# Patient Record
Sex: MALE | Race: WHITE | NOT HISPANIC OR LATINO | Employment: UNEMPLOYED | URBAN - METROPOLITAN AREA
[De-identification: names, ages, dates, MRNs, and addresses within clinical notes are randomized per-mention and may not be internally consistent; named-entity substitution may affect disease eponyms.]

---

## 2018-06-16 ENCOUNTER — HOSPITAL ENCOUNTER (EMERGENCY)
Facility: HOSPITAL | Age: 28
Discharge: HOME | End: 2018-06-16
Attending: EMERGENCY MEDICINE

## 2018-06-16 VITALS
DIASTOLIC BLOOD PRESSURE: 89 MMHG | TEMPERATURE: 99 F | SYSTOLIC BLOOD PRESSURE: 138 MMHG | HEART RATE: 75 BPM | RESPIRATION RATE: 17 BRPM | OXYGEN SATURATION: 97 %

## 2018-06-16 DIAGNOSIS — K08.89 PAIN, DENTAL: Primary | ICD-10-CM

## 2018-06-16 PROCEDURE — 99283 EMERGENCY DEPT VISIT LOW MDM: CPT

## 2018-06-16 PROCEDURE — 99283 EMERGENCY DEPT VISIT LOW MDM: CPT | Mod: ,,, | Performed by: PHYSICIAN ASSISTANT

## 2018-06-16 RX ORDER — CLINDAMYCIN HYDROCHLORIDE 300 MG/1
300 CAPSULE ORAL 3 TIMES DAILY
Qty: 30 CAPSULE | Refills: 0 | Status: SHIPPED | OUTPATIENT
Start: 2018-06-16 | End: 2018-06-26

## 2018-06-16 NOTE — ED TRIAGE NOTES
Pt arrived to ED c/o dental pain that started today. Pt denies any recent trauma to the teeth or gums. Positive for bleeding from gum line.

## 2018-06-16 NOTE — ED NOTES
LOC: Patient name and date of birth verified.  The patient is awake, alert and aware of environment with an appropriate affect, the patient is oriented x 3 and speaking appropriately.  Pt in NAD.    APPEARANCE: Patient resting comfortably and in no acute distress, patient is clean and well groomed, patient's clothing is properly fastened.  SKIN: The skin is warm and dry, color consistent with ethnicity, patient has normal skin turgor and moist mucus membranes, skin intact, no breakdown or brusing noted.  MUSCULOSKELETAL: Patient moving all extremities well, no obvious swelling or deformities noted.  RESPIRATORY: Airway is open and patent, respirations are spontaneous, patient has a normal effort and rate, no accessory muscle use noted.  CARDIAC: Patient has a normal rate and rhythm, no periphreal edema noted, capillary refill < 3 seconds.  ABDOMEN: Soft and non tender to palpation, no distention noted. Bowel sounds present in all four quadrants.  NEUROLOGIC: Eyes open spontaneously, behavior appropriate to situation, follows commands, facial expression symmetrical, bilateral hand grasp equal and even, purposeful motor response noted, normal sensation in all extremities when touched with a finger.

## 2018-06-16 NOTE — ED PROVIDER NOTES
"Encounter Date: 6/16/2018       History     Chief Complaint   Patient presents with    Dental Pain     Pt reports tooth abscess and pain is too severe.     Patient is a 27 year old male with PMHX of hepatitis C and substance abuse. He presents to the ED via EMS for dental pain. He reports having left upper molar pain for approximately 4 days. Describes pain as constant and throbbing. States "feeling an abscess bursts in his mouth." Rates pain 7/10. Reports pain relieved with warm compresses. He denies fever,chills, nausea, vomiting, sob, chest pain, abd pain, dysuria, diarrhea, or constipation. He is a current everyday smoker and reports alcohol use. Hx of IVDU-heroin, cocaine, and amphetamines.             Review of patient's allergies indicates:   Allergen Reactions    Pepto-bismol [bismuth subsalicylate] Hives     Past Medical History:   Diagnosis Date    Renal disorder      History reviewed. No pertinent surgical history.  History reviewed. No pertinent family history.  Social History   Substance Use Topics    Smoking status: Current Every Day Smoker     Packs/day: 1.00     Types: Cigarettes    Smokeless tobacco: Not on file    Alcohol use 12.0 oz/week     20 Cans of beer per week     Review of Systems   Constitutional: Negative for fever.   HENT: Positive for dental problem. Negative for sore throat.    Respiratory: Negative for shortness of breath.    Cardiovascular: Negative for chest pain.   Gastrointestinal: Negative for abdominal pain, nausea and vomiting.   Genitourinary: Negative for dysuria.   Musculoskeletal: Negative for back pain.   Skin: Negative for rash.   Neurological: Negative for weakness.   Hematological: Does not bruise/bleed easily.       Physical Exam     Initial Vitals [06/16/18 0004]   BP Pulse Resp Temp SpO2   (!) 146/108 66 18 98.6 °F (37 °C) 100 %      MAP       --         Physical Exam    Vitals reviewed.  Constitutional: He appears well-developed and well-nourished.   Patient " resting comfortably in NAD on RA.    HENT:   Head: Normocephalic and atraumatic.   Nose: Nose normal.   Mouth/Throat: Uvula is midline, oropharynx is clear and moist and mucous membranes are normal. No trismus in the jaw. Abnormal dentition. Dental caries present. No dental abscesses or uvula swelling.       Eyes: Conjunctivae and EOM are normal. Pupils are equal, round, and reactive to light.   Neck: Normal range of motion.   Cardiovascular: Normal rate and regular rhythm. Exam reveals no friction rub.    No murmur heard.  Pulmonary/Chest: Breath sounds normal. No respiratory distress. He has no wheezes. He has no rales.   Abdominal: Soft. Bowel sounds are normal. He exhibits no distension. There is no tenderness.   Musculoskeletal: Normal range of motion.   Neurological: He is alert and oriented to person, place, and time. He has normal strength. No sensory deficit.   Skin: Skin is warm and dry. No erythema.         ED Course   Procedures  Labs Reviewed - No data to display              APC / Resident Notes:   Patient is a 27 year old male with PMHX of hepatitis C and substance abuse. He presents to the ED via EMS for dental pain.    Differential diagnoses include, but are not limited to: dental caries, dental abscess, acute gingivitis, or cellulitis.     I have discussed emergency department findings, and plan with the patient. Will discharge home with antibiotics and F/U with dental services and PCP. Patient verbalizes understanding of plan and agrees. Return precautions given.     I have discussed and reviewed with my supervising physician.            Clinical Impression:   The encounter diagnosis was Pain, dental.      Disposition:   Disposition: Discharged                        Madelyn Montiel PA-C  06/16/18 [id number]